# Patient Record
Sex: FEMALE | Race: ASIAN | NOT HISPANIC OR LATINO
[De-identification: names, ages, dates, MRNs, and addresses within clinical notes are randomized per-mention and may not be internally consistent; named-entity substitution may affect disease eponyms.]

---

## 2021-06-13 ENCOUNTER — TRANSCRIPTION ENCOUNTER (OUTPATIENT)
Age: 31
End: 2021-06-13

## 2021-06-22 ENCOUNTER — TRANSCRIPTION ENCOUNTER (OUTPATIENT)
Age: 31
End: 2021-06-22

## 2021-08-05 ENCOUNTER — RESULT REVIEW (OUTPATIENT)
Age: 31
End: 2021-08-05

## 2021-09-30 ENCOUNTER — TRANSCRIPTION ENCOUNTER (OUTPATIENT)
Age: 31
End: 2021-09-30

## 2022-06-28 ENCOUNTER — RESULT REVIEW (OUTPATIENT)
Age: 32
End: 2022-06-28

## 2022-07-28 ENCOUNTER — APPOINTMENT (OUTPATIENT)
Dept: PEDIATRIC MEDICAL GENETICS | Facility: CLINIC | Age: 32
End: 2022-07-28

## 2022-07-28 DIAGNOSIS — Z78.9 OTHER SPECIFIED HEALTH STATUS: ICD-10-CM

## 2022-07-28 DIAGNOSIS — Z83.42 FAMILY HISTORY OF FAMILIAL HYPERCHOLESTEROLEMIA: ICD-10-CM

## 2022-07-28 DIAGNOSIS — Z82.49 FAMILY HISTORY OF ISCHEMIC HEART DISEASE AND OTHER DISEASES OF THE CIRCULATORY SYSTEM: ICD-10-CM

## 2022-07-28 DIAGNOSIS — O09.511 SUPERVISION OF ELDERLY PRIMIGRAVIDA, FIRST TRIMESTER: ICD-10-CM

## 2022-07-28 PROCEDURE — 96040: CPT | Mod: 95

## 2022-08-01 ENCOUNTER — TRANSCRIPTION ENCOUNTER (OUTPATIENT)
Age: 32
End: 2022-08-01

## 2022-08-04 ENCOUNTER — NON-APPOINTMENT (OUTPATIENT)
Age: 32
End: 2022-08-04

## 2022-08-05 ENCOUNTER — NON-APPOINTMENT (OUTPATIENT)
Age: 32
End: 2022-08-05

## 2022-08-11 ENCOUNTER — ASOB RESULT (OUTPATIENT)
Age: 32
End: 2022-08-11

## 2022-08-11 ENCOUNTER — LABORATORY RESULT (OUTPATIENT)
Age: 32
End: 2022-08-11

## 2022-08-11 ENCOUNTER — APPOINTMENT (OUTPATIENT)
Dept: ANTEPARTUM | Facility: CLINIC | Age: 32
End: 2022-08-11

## 2022-08-11 PROCEDURE — 36415 COLL VENOUS BLD VENIPUNCTURE: CPT

## 2022-08-11 PROCEDURE — 76813 OB US NUCHAL MEAS 1 GEST: CPT

## 2022-08-11 PROCEDURE — 76801 OB US < 14 WKS SINGLE FETUS: CPT

## 2022-08-22 ENCOUNTER — NON-APPOINTMENT (OUTPATIENT)
Age: 32
End: 2022-08-22

## 2022-09-27 ENCOUNTER — APPOINTMENT (OUTPATIENT)
Dept: ANTEPARTUM | Facility: CLINIC | Age: 32
End: 2022-09-27

## 2022-10-04 ENCOUNTER — APPOINTMENT (OUTPATIENT)
Dept: ANTEPARTUM | Facility: CLINIC | Age: 32
End: 2022-10-04

## 2022-10-04 ENCOUNTER — ASOB RESULT (OUTPATIENT)
Age: 32
End: 2022-10-04

## 2022-10-04 PROCEDURE — 76805 OB US >/= 14 WKS SNGL FETUS: CPT

## 2022-10-06 ENCOUNTER — NON-APPOINTMENT (OUTPATIENT)
Age: 32
End: 2022-10-06

## 2022-12-20 ENCOUNTER — NON-APPOINTMENT (OUTPATIENT)
Age: 32
End: 2022-12-20

## 2023-02-08 ENCOUNTER — APPOINTMENT (OUTPATIENT)
Dept: ANTEPARTUM | Facility: CLINIC | Age: 33
End: 2023-02-08

## 2023-02-08 ENCOUNTER — OUTPATIENT (OUTPATIENT)
Dept: INPATIENT UNIT | Facility: HOSPITAL | Age: 33
LOS: 1 days | Discharge: HOME | End: 2023-02-08
Payer: COMMERCIAL

## 2023-02-08 VITALS — HEART RATE: 77 BPM | SYSTOLIC BLOOD PRESSURE: 111 MMHG | DIASTOLIC BLOOD PRESSURE: 76 MMHG

## 2023-02-08 VITALS — DIASTOLIC BLOOD PRESSURE: 86 MMHG | SYSTOLIC BLOOD PRESSURE: 121 MMHG | HEART RATE: 99 BPM

## 2023-02-08 DIAGNOSIS — O26.899 OTHER SPECIFIED PREGNANCY RELATED CONDITIONS, UNSPECIFIED TRIMESTER: ICD-10-CM

## 2023-02-08 PROCEDURE — 99212 OFFICE O/P EST SF 10 MIN: CPT

## 2023-02-08 NOTE — OB PROVIDER TRIAGE NOTE - ADDITIONAL INSTRUCTIONS
's patient is a 32 y/o EDC 2023 EGA 38   ruled for labor, cleared for discharge to home by   - patient cleared for discharge to home, to follow up with  today,  at 2pm  - patient to return for decreased fetal movement, no fetal movement, leaking of fluid, vaginal bleeding, cramping, contractions, pain management  - patient verbalized discharge education

## 2023-02-08 NOTE — OB PROVIDER TRIAGE NOTE - NSHPPHYSICALEXAM_GEN_ALL_CORE
Vital Signs Last 24 Hrs  T(C): 36.9 (08 Feb 2023 07:14), Max: 36.9 (08 Feb 2023 07:14)  T(F): 98.4 (08 Feb 2023 07:14), Max: 98.4 (08 Feb 2023 07:14)  HR: 86 (08 Feb 2023 07:34) (86 - 103)  BP: 110/70 (08 Feb 2023 07:34) (110/70 - 121/86)  RR: 14 (08 Feb 2023 07:14) (14 - 14)  FHR:  CTX:  SVE: 1/40/-3 Vital Signs Last 24 Hrs  T(C): 36.9 (08 Feb 2023 07:14), Max: 36.9 (08 Feb 2023 07:14)  T(F): 98.4 (08 Feb 2023 07:14), Max: 98.4 (08 Feb 2023 07:14)  HR: 86 (08 Feb 2023 07:34) (86 - 103)  BP: 110/70 (08 Feb 2023 07:34) (110/70 - 121/86)  RR: 14 (08 Feb 2023 07:14) (14 - 14)  FHR: 145 baseline with moderate variability, accelerations present, no decelerations  CTX: occasional  SVE: 1/40/-3

## 2023-02-08 NOTE — OB PROVIDER TRIAGE NOTE - HISTORY OF PRESENT ILLNESS
;s patient is a 34 y/o EDC 2023 EGA 38   reports of contractions every 5 minutes since 0400 am. Patient reports of pain 3-4 out 10 on numeric pain scale. Patient does not want pain management. Patient reports most recent exam, 2 weeks ago, 0 cm dilation. Patient reports of fetal movements. Denies leaking of fluid, vaginal bleeding.     GBS status: negative 2023  AP complications: Denies  Medical and Surgical History: Denies  OBGYN History: Denies  Mental Health History: Denies

## 2023-02-08 NOTE — OB RN TRIAGE NOTE - FALL HARM RISK - RISK INTERVENTIONS

## 2023-02-09 ENCOUNTER — APPOINTMENT (OUTPATIENT)
Dept: ANTEPARTUM | Facility: CLINIC | Age: 33
End: 2023-02-09

## 2023-02-09 ENCOUNTER — INPATIENT (INPATIENT)
Facility: HOSPITAL | Age: 33
LOS: 1 days | Discharge: HOME | End: 2023-02-11
Attending: SPECIALIST | Admitting: SPECIALIST

## 2023-02-09 VITALS
TEMPERATURE: 98 F | DIASTOLIC BLOOD PRESSURE: 81 MMHG | HEART RATE: 71 BPM | SYSTOLIC BLOOD PRESSURE: 116 MMHG | RESPIRATION RATE: 20 BRPM

## 2023-02-09 DIAGNOSIS — O09.513 SUPERVISION OF ELDERLY PRIMIGRAVIDA, THIRD TRIMESTER: ICD-10-CM

## 2023-02-09 DIAGNOSIS — O26.899 OTHER SPECIFIED PREGNANCY RELATED CONDITIONS, UNSPECIFIED TRIMESTER: ICD-10-CM

## 2023-02-09 DIAGNOSIS — O47.1 FALSE LABOR AT OR AFTER 37 COMPLETED WEEKS OF GESTATION: ICD-10-CM

## 2023-02-09 DIAGNOSIS — Z3A.38 38 WEEKS GESTATION OF PREGNANCY: ICD-10-CM

## 2023-02-09 LAB
BASOPHILS # BLD AUTO: 0.02 K/UL — SIGNIFICANT CHANGE UP (ref 0–0.2)
BASOPHILS NFR BLD AUTO: 0.2 % — SIGNIFICANT CHANGE UP (ref 0–2)
BLD GP AB SCN SERPL QL: NEGATIVE — SIGNIFICANT CHANGE UP
EOSINOPHIL # BLD AUTO: 0.01 K/UL — SIGNIFICANT CHANGE UP (ref 0–0.5)
EOSINOPHIL NFR BLD AUTO: 0.1 % — SIGNIFICANT CHANGE UP (ref 0–6)
HCT VFR BLD CALC: 39.2 % — SIGNIFICANT CHANGE UP (ref 34.5–45)
HGB BLD-MCNC: 13 G/DL — SIGNIFICANT CHANGE UP (ref 11.5–15.5)
IANC: 10.08 K/UL — HIGH (ref 1.8–7.4)
IMM GRANULOCYTES NFR BLD AUTO: 0.5 % — SIGNIFICANT CHANGE UP (ref 0–0.9)
LYMPHOCYTES # BLD AUTO: 1.73 K/UL — SIGNIFICANT CHANGE UP (ref 1–3.3)
LYMPHOCYTES # BLD AUTO: 13.8 % — SIGNIFICANT CHANGE UP (ref 13–44)
MCHC RBC-ENTMCNC: 30.6 PG — SIGNIFICANT CHANGE UP (ref 27–34)
MCHC RBC-ENTMCNC: 33.2 GM/DL — SIGNIFICANT CHANGE UP (ref 32–36)
MCV RBC AUTO: 92.2 FL — SIGNIFICANT CHANGE UP (ref 80–100)
MONOCYTES # BLD AUTO: 0.61 K/UL — SIGNIFICANT CHANGE UP (ref 0–0.9)
MONOCYTES NFR BLD AUTO: 4.9 % — SIGNIFICANT CHANGE UP (ref 2–14)
NEUTROPHILS # BLD AUTO: 10.08 K/UL — HIGH (ref 1.8–7.4)
NEUTROPHILS NFR BLD AUTO: 80.5 % — HIGH (ref 43–77)
NRBC # BLD: 0 /100 WBCS — SIGNIFICANT CHANGE UP (ref 0–0)
NRBC # FLD: 0 K/UL — SIGNIFICANT CHANGE UP (ref 0–0)
PLATELET # BLD AUTO: 269 K/UL — SIGNIFICANT CHANGE UP (ref 150–400)
RBC # BLD: 4.25 M/UL — SIGNIFICANT CHANGE UP (ref 3.8–5.2)
RBC # FLD: 13.7 % — SIGNIFICANT CHANGE UP (ref 10.3–14.5)
RH IG SCN BLD-IMP: POSITIVE — SIGNIFICANT CHANGE UP
RH IG SCN BLD-IMP: POSITIVE — SIGNIFICANT CHANGE UP
SARS-COV-2 RNA SPEC QL NAA+PROBE: SIGNIFICANT CHANGE UP
WBC # BLD: 12.51 K/UL — HIGH (ref 3.8–10.5)
WBC # FLD AUTO: 12.51 K/UL — HIGH (ref 3.8–10.5)

## 2023-02-09 RX ORDER — ACETAMINOPHEN 500 MG
975 TABLET ORAL
Refills: 0 | Status: DISCONTINUED | OUTPATIENT
Start: 2023-02-09 | End: 2023-02-11

## 2023-02-09 RX ORDER — CHLORHEXIDINE GLUCONATE 213 G/1000ML
1 SOLUTION TOPICAL ONCE
Refills: 0 | Status: DISCONTINUED | OUTPATIENT
Start: 2023-02-09 | End: 2023-02-10

## 2023-02-09 RX ORDER — HYDROCORTISONE 1 %
1 OINTMENT (GRAM) TOPICAL EVERY 6 HOURS
Refills: 0 | Status: DISCONTINUED | OUTPATIENT
Start: 2023-02-09 | End: 2023-02-11

## 2023-02-09 RX ORDER — BENZOCAINE 10 %
1 GEL (GRAM) MUCOUS MEMBRANE EVERY 6 HOURS
Refills: 0 | Status: DISCONTINUED | OUTPATIENT
Start: 2023-02-09 | End: 2023-02-11

## 2023-02-09 RX ORDER — OXYTOCIN 10 UNIT/ML
333.33 VIAL (ML) INJECTION
Qty: 20 | Refills: 0 | Status: DISCONTINUED | OUTPATIENT
Start: 2023-02-09 | End: 2023-02-10

## 2023-02-09 RX ORDER — OXYCODONE HYDROCHLORIDE 5 MG/1
5 TABLET ORAL
Refills: 0 | Status: DISCONTINUED | OUTPATIENT
Start: 2023-02-09 | End: 2023-02-11

## 2023-02-09 RX ORDER — SIMETHICONE 80 MG/1
80 TABLET, CHEWABLE ORAL EVERY 4 HOURS
Refills: 0 | Status: DISCONTINUED | OUTPATIENT
Start: 2023-02-09 | End: 2023-02-11

## 2023-02-09 RX ORDER — AMPICILLIN TRIHYDRATE 250 MG
CAPSULE ORAL
Refills: 0 | Status: DISCONTINUED | OUTPATIENT
Start: 2023-02-10 | End: 2023-02-10

## 2023-02-09 RX ORDER — ACETAMINOPHEN 500 MG
1000 TABLET ORAL ONCE
Refills: 0 | Status: COMPLETED | OUTPATIENT
Start: 2023-02-09 | End: 2023-02-09

## 2023-02-09 RX ORDER — TETANUS TOXOID, REDUCED DIPHTHERIA TOXOID AND ACELLULAR PERTUSSIS VACCINE, ADSORBED 5; 2.5; 8; 8; 2.5 [IU]/.5ML; [IU]/.5ML; UG/.5ML; UG/.5ML; UG/.5ML
0.5 SUSPENSION INTRAMUSCULAR ONCE
Refills: 0 | Status: DISCONTINUED | OUTPATIENT
Start: 2023-02-09 | End: 2023-02-11

## 2023-02-09 RX ORDER — AER TRAVELER 0.5 G/1
1 SOLUTION RECTAL; TOPICAL EVERY 4 HOURS
Refills: 0 | Status: DISCONTINUED | OUTPATIENT
Start: 2023-02-09 | End: 2023-02-11

## 2023-02-09 RX ORDER — OXYTOCIN 10 UNIT/ML
41.67 VIAL (ML) INJECTION
Qty: 20 | Refills: 0 | Status: DISCONTINUED | OUTPATIENT
Start: 2023-02-09 | End: 2023-02-10

## 2023-02-09 RX ORDER — SODIUM CHLORIDE 9 MG/ML
500 INJECTION, SOLUTION INTRAVENOUS ONCE
Refills: 0 | Status: COMPLETED | OUTPATIENT
Start: 2023-02-09 | End: 2023-02-09

## 2023-02-09 RX ORDER — AMPICILLIN TRIHYDRATE 250 MG
2 CAPSULE ORAL ONCE
Refills: 0 | Status: COMPLETED | OUTPATIENT
Start: 2023-02-09 | End: 2023-02-09

## 2023-02-09 RX ORDER — OXYTOCIN 10 UNIT/ML
2 VIAL (ML) INJECTION
Qty: 30 | Refills: 0 | Status: DISCONTINUED | OUTPATIENT
Start: 2023-02-09 | End: 2023-02-10

## 2023-02-09 RX ORDER — KETOROLAC TROMETHAMINE 30 MG/ML
30 SYRINGE (ML) INJECTION ONCE
Refills: 0 | Status: DISCONTINUED | OUTPATIENT
Start: 2023-02-09 | End: 2023-02-10

## 2023-02-09 RX ORDER — SODIUM CHLORIDE 9 MG/ML
1000 INJECTION, SOLUTION INTRAVENOUS
Refills: 0 | Status: DISCONTINUED | OUTPATIENT
Start: 2023-02-09 | End: 2023-02-10

## 2023-02-09 RX ORDER — SODIUM CHLORIDE 9 MG/ML
3 INJECTION INTRAMUSCULAR; INTRAVENOUS; SUBCUTANEOUS EVERY 8 HOURS
Refills: 0 | Status: DISCONTINUED | OUTPATIENT
Start: 2023-02-09 | End: 2023-02-11

## 2023-02-09 RX ORDER — AMPICILLIN TRIHYDRATE 250 MG
2 CAPSULE ORAL EVERY 6 HOURS
Refills: 0 | Status: DISCONTINUED | OUTPATIENT
Start: 2023-02-10 | End: 2023-02-10

## 2023-02-09 RX ORDER — GENTAMICIN SULFATE 40 MG/ML
400 VIAL (ML) INJECTION ONCE
Refills: 0 | Status: COMPLETED | OUTPATIENT
Start: 2023-02-09 | End: 2023-02-10

## 2023-02-09 RX ORDER — DIPHENHYDRAMINE HCL 50 MG
25 CAPSULE ORAL EVERY 6 HOURS
Refills: 0 | Status: DISCONTINUED | OUTPATIENT
Start: 2023-02-09 | End: 2023-02-11

## 2023-02-09 RX ORDER — LANOLIN
1 OINTMENT (GRAM) TOPICAL EVERY 6 HOURS
Refills: 0 | Status: DISCONTINUED | OUTPATIENT
Start: 2023-02-09 | End: 2023-02-11

## 2023-02-09 RX ORDER — DIBUCAINE 1 %
1 OINTMENT (GRAM) RECTAL EVERY 6 HOURS
Refills: 0 | Status: DISCONTINUED | OUTPATIENT
Start: 2023-02-09 | End: 2023-02-11

## 2023-02-09 RX ORDER — MAGNESIUM HYDROXIDE 400 MG/1
30 TABLET, CHEWABLE ORAL
Refills: 0 | Status: DISCONTINUED | OUTPATIENT
Start: 2023-02-09 | End: 2023-02-11

## 2023-02-09 RX ORDER — OXYCODONE HYDROCHLORIDE 5 MG/1
5 TABLET ORAL ONCE
Refills: 0 | Status: DISCONTINUED | OUTPATIENT
Start: 2023-02-09 | End: 2023-02-11

## 2023-02-09 RX ORDER — IBUPROFEN 200 MG
600 TABLET ORAL EVERY 6 HOURS
Refills: 0 | Status: COMPLETED | OUTPATIENT
Start: 2023-02-09 | End: 2024-01-08

## 2023-02-09 RX ORDER — PRAMOXINE HYDROCHLORIDE 150 MG/15G
1 AEROSOL, FOAM RECTAL EVERY 4 HOURS
Refills: 0 | Status: DISCONTINUED | OUTPATIENT
Start: 2023-02-09 | End: 2023-02-11

## 2023-02-09 RX ADMIN — Medication 2 MILLIUNIT(S)/MIN: at 18:30

## 2023-02-09 RX ADMIN — Medication 400 MILLIGRAM(S): at 22:44

## 2023-02-09 RX ADMIN — SODIUM CHLORIDE 1000 MILLILITER(S): 9 INJECTION, SOLUTION INTRAVENOUS at 20:02

## 2023-02-09 RX ADMIN — Medication 0.2 MILLIGRAM(S): at 23:21

## 2023-02-09 RX ADMIN — SODIUM CHLORIDE 125 MILLILITER(S): 9 INJECTION, SOLUTION INTRAVENOUS at 17:26

## 2023-02-09 RX ADMIN — Medication 200 GRAM(S): at 22:40

## 2023-02-09 RX ADMIN — Medication 1000 MILLIGRAM(S): at 23:01

## 2023-02-09 NOTE — OB PROVIDER DELIVERY SUMMARY - NSSELHIDDEN_OBGYN_ALL_OB_FT
[NS_DeliveryAttending1_OBGYN_ALL_OB_FT:[id number]],[NS_DeliveryAssist1_OBGYN_ALL_OB_FT:[id number]]

## 2023-02-09 NOTE — OB PROVIDER LABOR PROGRESS NOTE - NS_SUBJECTIVE/OBJECTIVE_OBGYN_ALL_OB_FT
R1 Labor & Delivery Progress Note     Pt seen & examined at bedside for a witnessed deceleration.     T(C): 37.6 (23 @ 19:54), Max: 37.6 (23 @ 19:54)  HR: 84 (23 @ 20:08) (64 - 123)  BP: 107/56 (23 @ 20:08) (97/55 - 131/87)  RR: 19 (23 @ 14:02) (19 - 20)  SpO2: 95% (23 @ 20:08) (93% - 97%)    Plan: 33y y/o  @39w evaluated following  witnessed deceleration. Fetal heart rate returned to baseline following repositioning. Pitocin was @10. Pitocin paused. 1L bolus initiated.     - Restart pitocin @6 after 30 minutes  - Con't IOL  - Continuous EFM, [place]  - Con't IVF    Attending physician Dr. Bolaños, at bedside.   Ingris Whitman  PGY-1

## 2023-02-09 NOTE — OB PROVIDER TRIAGE NOTE - NSOBPROVIDERNOTE_OBGYN_ALL_OB_FT
32yo female P0 @ 39.0 wks SLIUP uncomp PNC here complaining of ctx'  -VE @ 4/90/-3  -limited BPP to confirm vtx presentation with anterior placenta  -GBS negative  -pt desires epidural  -pt was dw Dr Shell; approved for epidural  -pt was swabbed for covid-19  -PNRs were reviewed

## 2023-02-09 NOTE — OB RN DELIVERY SUMMARY - NS_LABORCHARACTER_OBGYN_ALL_OB
Induction of labor-AROM/Augmentation of labor/Febrile (>38C)/External electronic FM/Antibiotics in labor/Meconium staining/Chorioamnionitis

## 2023-02-09 NOTE — OB PROVIDER TRIAGE NOTE - HISTORY OF PRESENT ILLNESS
34yo female P0 @ 39.0 wks SLIUP uncomp PNC here complaining of ctx's Q3-6 min apart; intact with a bloody show. Pt reports she was here yesterday and was discharged home at 1 cm. Pt is intact with GFM.    Pmhx- denies  Pshx/Hosp- oral sx   Meds- PNV  NKDA  Past ob- denies  Gyn- denies  Soc- denies

## 2023-02-09 NOTE — OB RN PATIENT PROFILE - FALL HARM RISK - RISK INTERVENTIONS

## 2023-02-09 NOTE — OB PROVIDER TRIAGE NOTE - NSHPPHYSICALEXAM_GEN_ALL_CORE
ICU Vital Signs Last 24 Hrs  T(C): 36.8 (09 Feb 2023 13:04), Max: 36.8 (09 Feb 2023 13:04)  T(F): 98.24 (09 Feb 2023 13:04), Max: 98.24 (09 Feb 2023 13:04)  HR: 71 (09 Feb 2023 13:07) (71 - 71)  BP: 116/81 (09 Feb 2023 13:07) (116/81 - 116/81)  BP(mean): --  ABP: --  ABP(mean): --  RR: 20 (09 Feb 2023 13:04) (20 - 20)  SpO2: --    Gen: A&O x 3; uncomfortable with ctx's    Pulm- CTA b/L; unlabored breathing  Cor- clear S1S2  Abd exam- soft and nontender    VE-4/90/-3  Limited TAS to confirm fetus is vtx with anterior placenta  NST cat I with 145 baseline with accels and mod variability; ctx's Q5 min  GBS negative as of 1/23/23  EFW~3572

## 2023-02-09 NOTE — OB RN DELIVERY SUMMARY - NS_SEPSISRSKCALC_OBGYN_ALL_OB_FT
EOS calculated successfully. EOS Risk Factor: 0.5/1000 live births ([facility] national incidence); GA=39w;Temp=101.12; ROM=5.567; GBS='Negative'; Antibiotics='No antibiotics or any antibiotics < 2 hrs prior to birth'

## 2023-02-09 NOTE — CHART NOTE - NSCHARTNOTEFT_GEN_A_CORE
Pt without complaints.     Pt examined per Dr Shell's request.    VE  at 5.5/90/-3    NST cat I with accels and mod variability    Plan-Dr Shell notified

## 2023-02-09 NOTE — OB PROVIDER DELIVERY SUMMARY - NSPROVIDERDELIVERYNOTE_OBGYN_ALL_OB_FT
Patient was found to be fully dilated and directed to push with contractions. Spontaneous vaginal delivery of liveborn female. Nuchal x1, easily reduced. Head, shoulders and body delivered easily. Cord was delayed 1 minute. Cord was cut. Infant was passed to mother and then pediatrics for airway clearance and stimulation d/t heavy meconium. Placenta delivered spontaneously intact. Uterine massage was performed and pitocin was given. Moderate atony noted. IM Methergine administered. Vaginal walls, sulci, and cervix examined and first degree laceration noted. First degree laceration repaired with chromic.Fundus reexamined and was firm.  Good hemostasis was noted.  Count correct x2.

## 2023-02-10 ENCOUNTER — TRANSCRIPTION ENCOUNTER (OUTPATIENT)
Age: 33
End: 2023-02-10

## 2023-02-10 LAB
COVID-19 SPIKE DOMAIN AB INTERP: POSITIVE
COVID-19 SPIKE DOMAIN ANTIBODY RESULT: >250 U/ML — HIGH
SARS-COV-2 IGG+IGM SERPL QL IA: >250 U/ML — HIGH
SARS-COV-2 IGG+IGM SERPL QL IA: POSITIVE
T PALLIDUM AB TITR SER: NEGATIVE — SIGNIFICANT CHANGE UP

## 2023-02-10 RX ORDER — ACETAMINOPHEN 500 MG
3 TABLET ORAL
Qty: 0 | Refills: 0 | DISCHARGE
Start: 2023-02-10

## 2023-02-10 RX ORDER — GENTAMICIN SULFATE 40 MG/ML
400 VIAL (ML) INJECTION ONCE
Refills: 0 | Status: DISCONTINUED | OUTPATIENT
Start: 2023-02-11 | End: 2023-02-10

## 2023-02-10 RX ORDER — IBUPROFEN 200 MG
1 TABLET ORAL
Qty: 0 | Refills: 0 | DISCHARGE
Start: 2023-02-10

## 2023-02-10 RX ORDER — IBUPROFEN 200 MG
600 TABLET ORAL EVERY 6 HOURS
Refills: 0 | Status: DISCONTINUED | OUTPATIENT
Start: 2023-02-10 | End: 2023-02-11

## 2023-02-10 RX ADMIN — Medication 600 MILLIGRAM(S): at 14:25

## 2023-02-10 RX ADMIN — Medication 1 TABLET(S): at 11:25

## 2023-02-10 RX ADMIN — MAGNESIUM HYDROXIDE 30 MILLILITER(S): 400 TABLET, CHEWABLE ORAL at 18:22

## 2023-02-10 RX ADMIN — Medication 600 MILLIGRAM(S): at 09:16

## 2023-02-10 RX ADMIN — Medication 500 MILLIGRAM(S): at 01:37

## 2023-02-10 RX ADMIN — SODIUM CHLORIDE 3 MILLILITER(S): 9 INJECTION INTRAMUSCULAR; INTRAVENOUS; SUBCUTANEOUS at 06:08

## 2023-02-10 RX ADMIN — Medication 975 MILLIGRAM(S): at 17:53

## 2023-02-10 RX ADMIN — Medication 600 MILLIGRAM(S): at 14:55

## 2023-02-10 RX ADMIN — Medication 975 MILLIGRAM(S): at 11:55

## 2023-02-10 RX ADMIN — Medication 975 MILLIGRAM(S): at 06:08

## 2023-02-10 RX ADMIN — Medication 600 MILLIGRAM(S): at 09:46

## 2023-02-10 RX ADMIN — Medication 975 MILLIGRAM(S): at 11:25

## 2023-02-10 RX ADMIN — Medication 975 MILLIGRAM(S): at 05:36

## 2023-02-10 RX ADMIN — Medication 975 MILLIGRAM(S): at 18:23

## 2023-02-10 RX ADMIN — Medication 975 MILLIGRAM(S): at 21:40

## 2023-02-10 RX ADMIN — Medication 975 MILLIGRAM(S): at 22:40

## 2023-02-10 RX ADMIN — SODIUM CHLORIDE 3 MILLILITER(S): 9 INJECTION INTRAMUSCULAR; INTRAVENOUS; SUBCUTANEOUS at 14:12

## 2023-02-10 NOTE — DISCHARGE NOTE OB - NS MD DC FALL RISK RISK
For information on Fall & Injury Prevention, visit: [email or web address] OR  [email or web address] OR  https://www.cdc.gov/steadi/patient.html

## 2023-02-10 NOTE — PROGRESS NOTE ADULT - SUBJECTIVE AND OBJECTIVE BOX
S: Patient doing well. Minimal lochia. Pain controlled.    O: Vital Signs Last 24 Hrs  T(C): 36.7 (10 Feb 2023 05:55), Max: 38.4 (2023 22:33)  T(F): 98 (10 Feb 2023 05:55), Max: 101.12 (2023 22:33)  HR: 84 (10 Feb 2023 05:55) (64 - 136)  BP: 106/76 (10 Feb 2023 05:55) (97/55 - 131/87)  BP(mean): 83 (10 Feb 2023 05:55) (82 - 83)  RR: 19 (10 Feb 2023 05:55) (18 - 20)  SpO2: 100% (10 Feb 2023 05:55) (82% - 100%)    Parameters below as of 10 Feb 2023 05:55  Patient On (Oxygen Delivery Method): room air        Gen: NAD  Abd: soft, NT, ND, fundus firm below umbilicus  Lochia: moderate  Ext: no tenderness    Labs:                        13.0   12.51 )-----------( 269      ( 2023 13:30 )             39.2       A: 33y PPD# 1s/p  doing well.  Plan: Rouitne care  Dc with instructions

## 2023-02-10 NOTE — LACTATION INITIAL EVALUATION - LACTATION INTERVENTIONS
initiate/review safe skin-to-skin/initiate/review hand expression/initiate/review techniques for position and latch/review techniques to increase milk supply/review techniques to manage sore nipples/engorgement/initiate/review breast massage/compression/reviewed components of an effective feeding and at least 8 effective feedings per day required/reviewed importance of monitoring infant diapers, the breastfeeding log, and minimum output each day/reviewed risks of artificial nipples/reviewed benefits and recommendations for rooming in/reviewed feeding on demand/by cue at least 8 times a day/reviewed indications of inadequate milk transfer that would require supplementation

## 2023-02-10 NOTE — OB NEONATOLOGY/PEDIATRICIAN DELIVERY SUMMARY - NSPEDSNEONOTESA_OBGYN_ALL_OB_FT
Peds called to LDR for chorio and category 2 tracing. 39 wk female born via  to a 34 y/o  mother. Prenatal history of chorioamnionitis, amp given. No significant maternal history. Maternal labs include Blood Type O+, HIV - , RPR NR , Rubella I , Hep B - , GBS -, COVID-. AROM at 17:30 with heavy meconium fluids (ROM hours: 6). Baby emerged vigorous, crying, was warmed, dried suctioned and stimulated with APGARS of 8/9. NC x 1. Required deep suctioning, chest PT for coarse breathing. Needed CPAP for 10 minutes at 5/21%, and was able to tolerate RA afterwards. Mom plans to initiate breastfeeding, consents Hep B vaccine.  Highest maternal temp: 38.4. EOS 1.    Physical Exam:  Gen: no acute distress, +grimace  HEENT:  anterior fontanel open soft and flat, nondysmoprhic facies, no cleft lip/palate, ears normal set, no ear pits or tags, nares clinically patent  Resp: Mild subcostal retractions, good air entry b/l, clear to auscultation bilaterally  Cardio: Present S1/S2, regular rate and rhythm, no murmurs  Abd: soft, non tender, non distended  Neuro: +palmar and plantar grasp, +suck, +cynthia, normal tone  Extremities: moving all extremities, no clavicular crepitus or stepoff  Skin: pink, warm  Genitals: Normal female anatomy, Steven 1

## 2023-02-10 NOTE — DISCHARGE NOTE OB - PATIENT PORTAL LINK FT
You can access the FollowMyHealth Patient Portal offered by [facility] by registering at the following website: [email or web address]. By joining [employer]’s FollowMyHealth portal, you will also be able to view your health information using other applications (apps) compatible with our system.

## 2023-02-10 NOTE — DISCHARGE NOTE OB - MATERIALS PROVIDED
Vaccinations/[facility]  Screening Program/  Immunization Record/Breastfeeding Log/Breastfeeding Mother’s Support Group Information/Guide to Postpartum Care/[facility] Hearing Screen Program/Back To Sleep Handout/Shaken Baby Prevention Handout/Breastfeeding Guide and Packet/Birth Certificate Instructions/Discharge Medication Information for Patients and Families Pocket Guide/Tdap Vaccination (VIS Pub Date: 2012)

## 2023-02-10 NOTE — DISCHARGE NOTE OB - CARE PROVIDER_API CALL
Tate Bolaños)  Obstetrics and Gynecology  [address] [place] [address]  Phone: [phone]  Fax: [phone]  Follow Up Time: 1 month

## 2023-02-10 NOTE — DISCHARGE NOTE OB - MEDICATION SUMMARY - MEDICATIONS TO TAKE
I will START or STAY ON the medications listed below when I get home from the hospital:    acetaminophen 325 mg oral tablet  -- 3 tab(s) by mouth every 8 hours  -- Indication: For Labor and delivery, indication for care    ibuprofen 600 mg oral tablet  -- 1 tab(s) by mouth every 6 hours  -- Indication: For Encounter for full-term uncomplicated delivery    Prenatal 1 oral capsule  -- Indication: For Encounter for full-term uncomplicated delivery   I will START or STAY ON the medications listed below when I get home from the hospital:    ibuprofen 600 mg oral tablet  -- 1 tab(s) by mouth every 6 hours, As Needed  -- Indication: For pain    acetaminophen 325 mg oral tablet  -- 3 tab(s) by mouth every 8 hours, As Needed  -- Indication: For pain    Prenatal 1 oral capsule  -- Indication: For vitamins

## 2023-02-10 NOTE — LACTATION INITIAL EVALUATION - POTENTIAL FOR
ineffective breastfeeding/sore breast/s/sore nipples/engorgement/knowledge deficit/feeding confusion/latch on difficulty

## 2023-02-10 NOTE — DISCHARGE NOTE OB - CARE PLAN
1 Principal Discharge DX:	Normal vaginal delivery  Assessment and plan of treatment:	REGULAR DIET  AMBULATION ENCOURAGED

## 2023-02-11 VITALS
DIASTOLIC BLOOD PRESSURE: 86 MMHG | TEMPERATURE: 98 F | OXYGEN SATURATION: 100 % | HEART RATE: 72 BPM | RESPIRATION RATE: 16 BRPM | SYSTOLIC BLOOD PRESSURE: 119 MMHG

## 2023-02-11 RX ADMIN — Medication 975 MILLIGRAM(S): at 03:00

## 2023-02-11 RX ADMIN — Medication 1 TABLET(S): at 12:15

## 2023-02-11 RX ADMIN — Medication 600 MILLIGRAM(S): at 06:05

## 2023-02-11 RX ADMIN — Medication 975 MILLIGRAM(S): at 04:00

## 2023-02-11 RX ADMIN — Medication 975 MILLIGRAM(S): at 15:45

## 2023-02-11 RX ADMIN — Medication 600 MILLIGRAM(S): at 12:15

## 2023-02-17 ENCOUNTER — NON-APPOINTMENT (OUTPATIENT)
Age: 33
End: 2023-02-17

## 2023-02-20 ENCOUNTER — NON-APPOINTMENT (OUTPATIENT)
Age: 33
End: 2023-02-20

## 2023-02-21 ENCOUNTER — NON-APPOINTMENT (OUTPATIENT)
Age: 33
End: 2023-02-21

## 2023-03-13 ENCOUNTER — TRANSCRIPTION ENCOUNTER (OUTPATIENT)
Age: 33
End: 2023-03-13

## 2023-11-13 NOTE — OB PROVIDER H&P - NS_FORCEPSDELIVERY_OBGYN_ALL_OB_NU
Spoke to eitan Yao to cont the Prednisone 10 mg daily; will check CBC/CMP/JCV and then start Cellcept 1000 mg BID - discussed with patient     
0

## 2024-02-07 NOTE — OB PROVIDER TRIAGE NOTE - NSPROVTRIAGESELHIDDEN_OBGYN_ALL_OB_FT
Epidural Block    Patient location during procedure: OB  Start time: 2/6/2024 1:02 PM  End time: 2/6/2024 1:13 PM  Reason for block: labor epidural  Staffing  Performed: anesthesiologist   Anesthesiologist: Obdulio Morgan MD  Performed by: Obdulio Morgan MD  Authorized by: Obdulio Morgan MD    Epidural  Patient position: sitting  Prep: DuraPrep  Patient monitoring: cardiac monitor, continuous pulse ox and frequent blood pressure checks  Approach: midline  Location: L3-4  Injection technique: DUANE air  Provider prep: mask and sterile gloves  Needle  Needle type: Tuohy   Needle gauge: 17 G  Needle length: 3.5 in  Needle insertion depth: 6 cm  Catheter type: end hole  Catheter size: 18 G  Catheter at skin depth: 10 cmCatheter Secured: tegaderm and tape  Assessment  Sensory level: T10  Events: None  Hemodynamics: stable  Attempts: 1  Outcomes: uncomplicated and patient tolerated procedure well  Preanesthetic Checklist  Completed: patient identified, IV checked, site marked, risks and benefits discussed, surgical/procedural consents, equipment checked, pre-op evaluation, timeout performed, anesthesia consent given, oxygen available, monitors applied/VS acknowledged and fire risk safety assessment completed and verbalized          
Epidural Block    Patient location during procedure: OB  Start time: 2/7/2024 1:55 PM  End time: 2/7/2024 2:05 PM  Reason for block: labor epidural  Staffing  Performed: anesthesiologist   Anesthesiologist: Obdulio Morgan MD  Performed by: Obdulio Morgan MD  Authorized by: Obdulio Morgan MD    Epidural  Patient position: sitting  Prep: DuraPrep  Patient monitoring: cardiac monitor, continuous pulse ox and frequent blood pressure checks  Approach: midline  Location: L2-3  Injection technique: DUANE air  Provider prep: mask and sterile gloves  Needle  Needle type: Tuohy   Needle gauge: 17 G  Needle length: 3.5 in  Needle insertion depth: 5 cm  Catheter type: end hole  Catheter size: 18 G  Catheter at skin depth: 9 cmCatheter Secured: tegaderm and tape  Assessment  Sensory level: T10  Events: None  Hemodynamics: stable  Attempts: 1  Outcomes: uncomplicated and patient tolerated procedure well  Additional Notes  Pt with break through pain and likelihood of section so catheter replaced  Preanesthetic Checklist  Completed: patient identified, IV checked, site marked, risks and benefits discussed, surgical/procedural consents, equipment checked, pre-op evaluation, timeout performed, anesthesia consent given, oxygen available, monitors applied/VS acknowledged and fire risk safety assessment completed and verbalized          
[NS_AttendInformed_OBGYN_ALL_OB:[id number]]

## 2024-04-30 NOTE — OB RN TRIAGE NOTE - CHIEF COMPLAINT QUOTE
S/w pt and conveyed PCP's message pt verbalized understanding and agreed to call clinic with any concerns.    Closing TE.   Contractions since 2/8, bloody show, 2/8 1cm

## 2024-11-18 ENCOUNTER — NON-APPOINTMENT (OUTPATIENT)
Age: 34
End: 2024-11-18

## 2024-11-18 ENCOUNTER — APPOINTMENT (OUTPATIENT)
Facility: CLINIC | Age: 34
End: 2024-11-18

## 2024-11-18 DIAGNOSIS — Z98.890 OTHER SPECIFIED POSTPROCEDURAL STATES: ICD-10-CM

## 2024-11-18 DIAGNOSIS — Z78.9 OTHER SPECIFIED HEALTH STATUS: ICD-10-CM

## 2024-11-18 DIAGNOSIS — Z83.3 FAMILY HISTORY OF DIABETES MELLITUS: ICD-10-CM

## 2024-12-04 ENCOUNTER — LABORATORY RESULT (OUTPATIENT)
Age: 34
End: 2024-12-04

## 2024-12-04 ENCOUNTER — APPOINTMENT (OUTPATIENT)
Facility: CLINIC | Age: 34
End: 2024-12-04

## 2024-12-04 VITALS — SYSTOLIC BLOOD PRESSURE: 110 MMHG | DIASTOLIC BLOOD PRESSURE: 70 MMHG

## 2024-12-04 DIAGNOSIS — Z00.00 ENCOUNTER FOR GENERAL ADULT MEDICAL EXAMINATION W/OUT ABNORMAL FINDINGS: ICD-10-CM

## 2024-12-04 PROCEDURE — 99459 PELVIC EXAMINATION: CPT

## 2024-12-04 PROCEDURE — 99395 PREV VISIT EST AGE 18-39: CPT

## 2024-12-04 PROCEDURE — 81025 URINE PREGNANCY TEST: CPT | Mod: NC

## 2024-12-04 RX ORDER — NORETHINDRONE ACETATE AND ETHINYL ESTRADIOL, ETHINYL ESTRADIOL AND FERROUS FUMARATE 1MG-10(24)
1 MG-10 MCG / KIT ORAL DAILY
Qty: 2 | Refills: 1 | Status: ACTIVE | COMMUNITY
Start: 2024-12-04 | End: 1900-01-01

## 2024-12-05 LAB — HCG UR QL: NEGATIVE

## 2025-06-30 ENCOUNTER — RX RENEWAL (OUTPATIENT)
Age: 35
End: 2025-06-30

## 2025-07-02 RX ORDER — NORETHINDRONE ACETATE AND ETHINYL ESTRADIOL 1MG-20(24)
1-20 KIT ORAL
Qty: 3 | Refills: 1 | Status: ACTIVE | COMMUNITY
Start: 2025-06-30 | End: 1900-01-01

## 2025-08-11 ENCOUNTER — APPOINTMENT (OUTPATIENT)
Facility: CLINIC | Age: 35
End: 2025-08-11
Payer: COMMERCIAL

## 2025-08-11 VITALS — DIASTOLIC BLOOD PRESSURE: 81 MMHG | SYSTOLIC BLOOD PRESSURE: 118 MMHG

## 2025-08-11 LAB — HCG UR QL: NEGATIVE

## 2025-08-11 PROCEDURE — 81025 URINE PREGNANCY TEST: CPT

## 2025-08-11 PROCEDURE — 99459 PELVIC EXAMINATION: CPT

## 2025-08-11 PROCEDURE — 99213 OFFICE O/P EST LOW 20 MIN: CPT
